# Patient Record
Sex: MALE | Race: WHITE | NOT HISPANIC OR LATINO | Employment: FULL TIME | URBAN - METROPOLITAN AREA
[De-identification: names, ages, dates, MRNs, and addresses within clinical notes are randomized per-mention and may not be internally consistent; named-entity substitution may affect disease eponyms.]

---

## 2019-02-22 ENCOUNTER — OFFICE VISIT (OUTPATIENT)
Dept: ORTHOPEDIC SURGERY | Facility: CLINIC | Age: 49
End: 2019-02-22

## 2019-02-22 VITALS — WEIGHT: 258 LBS | BODY MASS INDEX: 36.12 KG/M2 | HEIGHT: 71 IN | TEMPERATURE: 97.5 F

## 2019-02-22 DIAGNOSIS — M17.11 PRIMARY LOCALIZED OSTEOARTHROSIS OF RIGHT LOWER LEG: Primary | ICD-10-CM

## 2019-02-22 PROCEDURE — 99204 OFFICE O/P NEW MOD 45 MIN: CPT | Performed by: ORTHOPAEDIC SURGERY

## 2019-02-22 PROCEDURE — 20610 DRAIN/INJ JOINT/BURSA W/O US: CPT | Performed by: ORTHOPAEDIC SURGERY

## 2019-02-22 RX ORDER — CHOLECALCIFEROL (VITAMIN D3) 25 MCG
1000 CAPSULE ORAL
COMMUNITY
Start: 2017-06-08

## 2019-02-22 RX ORDER — ALLOPURINOL 100 MG/1
TABLET ORAL
COMMUNITY
Start: 2017-05-15

## 2019-02-22 RX ORDER — MELOXICAM 15 MG/1
TABLET ORAL
Qty: 30 TABLET | Refills: 0 | Status: SHIPPED | OUTPATIENT
Start: 2019-02-22 | End: 2019-05-09 | Stop reason: ALTCHOICE

## 2019-02-22 RX ORDER — ATORVASTATIN CALCIUM 20 MG/1
20 TABLET, FILM COATED ORAL DAILY
COMMUNITY
Start: 2017-05-15

## 2019-02-22 RX ORDER — ALBUTEROL SULFATE 90 UG/1
AEROSOL, METERED RESPIRATORY (INHALATION)
COMMUNITY
Start: 2018-01-18 | End: 2019-09-13

## 2019-02-22 RX ADMIN — LIDOCAINE HYDROCHLORIDE 4 ML: 10 INJECTION, SOLUTION EPIDURAL; INFILTRATION; INTRACAUDAL; PERINEURAL at 11:05

## 2019-02-22 RX ADMIN — METHYLPREDNISOLONE ACETATE 80 MG: 80 INJECTION, SUSPENSION INTRA-ARTICULAR; INTRALESIONAL; INTRAMUSCULAR; SOFT TISSUE at 11:05

## 2019-02-22 NOTE — PROGRESS NOTES
"Knee Follow Up      Patient: Ramana Sullivan        YOB: 1970            Chief Complaints: knee pain      History of Present Illness:      Physical Exam: 48 y.o. male  General Appearance:    Alert, cooperative, in no acute distress                   Vitals:    02/22/19 1036   Temp: 97.5 °F (36.4 °C)   Weight: 117 kg (258 lb)   Height: 180.3 cm (71\")        Patient is alert and read ×3 no acute distress appears her above-listed at height weight and age.  Affect is normal respiratory rate is normal unlabored. Heart rate regular rate rhythm, sclera, dentition and hearing are normal for the purpose of this exam.      Ortho Exam                 Assessment/Plan:            "

## 2019-02-22 NOTE — PROGRESS NOTES
New right Knee      Patient: Ramana Sullivan        YOB: 1970    Medical Record Number: [id number]        Chief Complaints: right knee pain  Chief Complaint   Patient presents with   • Right Knee - Pain, Establish Care           History of Present Illness: This is a 48-year-old male who presents complaining of right knee pain is been ongoing on a couple of years he was sent to Dr. 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 Dispense spacer as needed.   • allopurinol (ZYLOPRIM) 100 MG tablet TAKE 1 TABLET BY MOUTH EVERY DAY   • aspirin 81 MG tablet Take 81 mg by mouth Daily.   • atorvastatin (LIPITOR) 20 MG tablet Take 20 mg by mouth Daily.   • Cholecalciferol (VITAMIN D-3) 1000 units capsule Take 1,000 Units by mouth.     No current facility-administered medications on file prior to visit.      Current Medications:  Scheduled Meds:  Continuous Infusions:  No current facility-administered medications for this visit.   PRN Meds:.    Past Medical History:   Diagnosis Date   • Asthma    • Gout    • Hyperlipemia    • Sleep apnea       History reviewed. No pertinent surgical history.     Social History     Occupational History   • Not on file   Tobacco Use   • Smoking status: Never Smoker   Substance and Sexual Activity   • Alcohol use: Yes   • Drug use: Not on  "file   • Sexual activity: Not on file    Social History     Social History Narrative   • Not on file        Family History   Problem Relation Age of Onset   • Heart disease Father    • Cancer Father         bladder cancer             Review of Systems: 14 point review of systems are remarkable for the pertinent positives listed in the chart by the patient the remainder negative    Review of Systems      Physical Exam: 48 y.o. male  General Appearance:    Alert, cooperative, in no acute distress                 Vitals:    02/22/19 1036   Temp: 97.5 °F (36.4 °C)   Weight: 117 kg (258 lb)   Height: 180.3 cm (71\")      Patient is alert and read ×3 no acute distress appears her above-listed at height weight and age.  Affect is normal respiratory rate is normal unlabored. Heart rate regular rate rhythm, sclera, dentition and hearing are normal for the purpose of this exam.        Ortho Exam Physical exam of the right knee reveals no effusion, no erythema.  It mild loss of extension and full flexion  Patient has mild varus alignment.  They have mild tenderness to palpation about the medial compartment, no tenderness laterally..  The patient has a negative bounce home, negative Cayetano and a stable ligamentous exam.  Quad tone is reasonable and symmetric.  There are no overlying skin changes no lymphedema no lymphadenopathy.  There is good hip range of motion which is full symmetric and asymptomatic and a normal ankle exam.      Large Joint Arthrocentesis: R knee  Date/Time: 2/22/2019 11:05 AM  Consent given by: patient  Site marked: site marked  Timeout: Immediately prior to procedure a time out was called to verify the correct patient, procedure, equipment, support staff and site/side marked as required   Supporting Documentation  Indications: pain   Procedure Details  Location: knee - R knee  Preparation: Patient was prepped and draped in the usual sterile fashion  Needle size: 22 G  Approach: anteromedial  Medications " administered: 80 mg methylPREDNISolone acetate 80 MG/ML; 4 mL lidocaine PF 1% 1 %                     Radiology:   AP, Lateral and merchant views of the right knee  were ordered/reviewed to evauateknee pain.  He did bring these with him he does have some narrowing of his medial compartment but still some joint space remaining is some mild patellofemoral OA as well no acute bony pathology  Imaging Results (most recent)     None        Assessment/Plan:    Right knee pain I think this is degenerative meniscal pathology is probably playing a part in this I think is reasonable to inject this try to get this calm down also start him on Mobic with strict precautions for short period of time if he fails to improve we will pursue other means of testing

## 2019-02-26 RX ORDER — METHYLPREDNISOLONE ACETATE 80 MG/ML
80 INJECTION, SUSPENSION INTRA-ARTICULAR; INTRALESIONAL; INTRAMUSCULAR; SOFT TISSUE
Status: COMPLETED | OUTPATIENT
Start: 2019-02-22 | End: 2019-02-22

## 2019-02-26 RX ORDER — LIDOCAINE HYDROCHLORIDE 10 MG/ML
4 INJECTION, SOLUTION EPIDURAL; INFILTRATION; INTRACAUDAL; PERINEURAL
Status: COMPLETED | OUTPATIENT
Start: 2019-02-22 | End: 2019-02-22

## 2019-04-18 ENCOUNTER — OFFICE VISIT (OUTPATIENT)
Dept: ORTHOPEDIC SURGERY | Facility: CLINIC | Age: 49
End: 2019-04-18

## 2019-04-18 VITALS — HEIGHT: 71 IN | WEIGHT: 240 LBS | BODY MASS INDEX: 33.6 KG/M2 | TEMPERATURE: 97.9 F

## 2019-04-18 DIAGNOSIS — S83.242D TEAR OF MEDIAL MENISCUS OF LEFT KNEE, CURRENT, UNSPECIFIED TEAR TYPE, SUBSEQUENT ENCOUNTER: Primary | ICD-10-CM

## 2019-04-18 PROCEDURE — 99213 OFFICE O/P EST LOW 20 MIN: CPT | Performed by: ORTHOPAEDIC SURGERY

## 2019-04-18 NOTE — PROGRESS NOTES
"Knee Follow Up      Patient: Ramana Sullivan        YOB: 1970            Chief Complaints: knee pain right      History of Present Illness: Patient is here follow-up of right knee pain he got very temporary very minimal relief from an injection his symptoms do still seem mechanical with catching locking intermittent symptoms      Physical Exam: 49 y.o. male  General Appearance:    Alert, cooperative, in no acute distress                   Vitals:    04/18/19 0853   Temp: 97.9 °F (36.6 °C)   TempSrc: Temporal   Weight: 109 kg (240 lb)   Height: 180.3 cm (71\")        Patient is alert and read ×3 no acute distress appears her above-listed at height weight and age.  Affect is normal respiratory rate is normal unlabored. Heart rate regular rate rhythm, sclera, dentition and hearing are normal for the purpose of this exam.      Ortho Exam     Physical exam of the right knee reveals no effusion no redness.  The patient does have tenderness about the medial joint line.  No tenderness about the lateral joint line.  A negative bounce home and a positive medial Cayetano.  There is some pain medially  with a lateral Cayetano.  Patient has a stable ligamentous exam.  Quads are reasonable and symmetric bilaterally.  Calf is soft and nontender.  There is no overlying skin changes no lymphedema lymphadenopathy.  Patient has good hip range of motion full symmetric and asymptomatic and a normal ankle exam.      X-rays do show some mild narrowing of his medial compartment he does have greater than 50% of the joint space remaining he has some patellofemoral OA      Assessment/Plan:      Persistent right knee pain that has been unresponsive to conservative management plan is to proceed with an MRI and have him follow-up after that test      "

## 2019-04-23 ENCOUNTER — TELEPHONE (OUTPATIENT)
Dept: ORTHOPEDIC SURGERY | Facility: CLINIC | Age: 49
End: 2019-04-23

## 2019-05-06 ENCOUNTER — HOSPITAL ENCOUNTER (OUTPATIENT)
Dept: MRI IMAGING | Facility: HOSPITAL | Age: 49
Discharge: HOME | End: 2019-05-06
Admitting: ORTHOPAEDIC SURGERY

## 2019-05-06 DIAGNOSIS — M17.11 PRIMARY LOCALIZED OSTEOARTHROSIS OF RIGHT LOWER LEG: ICD-10-CM

## 2019-05-06 PROCEDURE — 73721 MRI JNT OF LWR EXTRE W/O DYE: CPT

## 2019-05-09 ENCOUNTER — OFFICE VISIT (OUTPATIENT)
Dept: ORTHOPEDIC SURGERY | Facility: CLINIC | Age: 49
End: 2019-05-09

## 2019-05-09 VITALS — TEMPERATURE: 98.1 F | BODY MASS INDEX: 34.33 KG/M2 | HEIGHT: 71 IN | WEIGHT: 245.2 LBS

## 2019-05-09 DIAGNOSIS — M17.11 PRIMARY LOCALIZED OSTEOARTHROSIS OF RIGHT LOWER LEG: Primary | ICD-10-CM

## 2019-05-09 PROCEDURE — 99213 OFFICE O/P EST LOW 20 MIN: CPT | Performed by: ORTHOPAEDIC SURGERY

## 2019-05-09 RX ORDER — MELOXICAM 15 MG/1
TABLET ORAL
Qty: 30 TABLET | Refills: 0 | Status: SHIPPED | OUTPATIENT
Start: 2019-05-09 | End: 2019-06-07 | Stop reason: SDUPTHER

## 2019-05-09 NOTE — PROGRESS NOTES
"Right Knee MRI Follow Up      Patient: Ramana Sullivan        YOB: 1970            Chief Complaints: Right Knee pain      History of Present Illness: The patient is here follow-up of an MRI of the knee partially extruded medial meniscal body.  His symptoms seem to be more with activity less so mechanical probably more due to his arthritis      Physical Exam: 49 y.o. male  General Appearance:    Alert, cooperative, in no acute distress                   Vitals:    05/09/19 1534   Temp: 98.1 °F (36.7 °C)   TempSrc: Temporal   Weight: 111 kg (245 lb 3.2 oz)   Height: 180.3 cm (71\")        Patient is alert and read ×3 no acute distress appears her above-listed at height weight and age.  Affect is normal respiratory rate is normal unlabored. Heart rate regular rate rhythm, sclera, dentition and hearing are normal for the purpose of this exam.      Ortho Exam Physical exam of the right knee reveals no effusion, no erythema.  It mild loss of extension and full flexion  Patient has mild varus alignment.  They have mild tenderness to palpation about the medial compartment, no tenderness laterally..  The patient has a negative bounce home, negative Cayetano and a stable ligamentous exam.  Quad tone is reasonable and symmetric.  There are no overlying skin changes no lymphedema no lymphadenopathy.  There is good hip range of motion which is full symmetric and asymptomatic and a normal ankle exam.      MRI Results: As above I have reviewed the films myself and agree with the findings also reviewed his prior trays which showed narrowing of the medial compartment with some osteophyte formation  Procedures      Assessment/Plan: And that I think is mostly due to his arthritis plan seen with conservative care I will start him on Mobic with strict precautions for short period of time intermittent injections he is asking about PRP which she will research and I discussed with him as well    "

## 2019-06-07 RX ORDER — MELOXICAM 15 MG/1
TABLET ORAL
Qty: 30 TABLET | Refills: 0 | Status: SHIPPED | OUTPATIENT
Start: 2019-06-07

## 2020-07-23 ENCOUNTER — OFFICE VISIT (OUTPATIENT)
Dept: ORTHOPEDIC SURGERY | Facility: CLINIC | Age: 50
End: 2020-07-23

## 2020-07-23 VITALS — HEIGHT: 70 IN | WEIGHT: 245.4 LBS | TEMPERATURE: 97.8 F | BODY MASS INDEX: 35.13 KG/M2

## 2020-07-23 DIAGNOSIS — S83.242A TEAR OF MEDIAL MENISCUS OF LEFT KNEE, CURRENT, UNSPECIFIED TEAR TYPE, INITIAL ENCOUNTER: ICD-10-CM

## 2020-07-23 DIAGNOSIS — M25.562 PAIN IN BOTH KNEES, UNSPECIFIED CHRONICITY: ICD-10-CM

## 2020-07-23 DIAGNOSIS — M25.562 LEFT KNEE PAIN, UNSPECIFIED CHRONICITY: Primary | ICD-10-CM

## 2020-07-23 DIAGNOSIS — M25.561 PAIN IN BOTH KNEES, UNSPECIFIED CHRONICITY: ICD-10-CM

## 2020-07-23 DIAGNOSIS — M17.11 PRIMARY LOCALIZED OSTEOARTHROSIS OF RIGHT LOWER LEG: ICD-10-CM

## 2020-07-23 PROCEDURE — 20610 DRAIN/INJ JOINT/BURSA W/O US: CPT | Performed by: ORTHOPAEDIC SURGERY

## 2020-07-23 PROCEDURE — 99214 OFFICE O/P EST MOD 30 MIN: CPT | Performed by: ORTHOPAEDIC SURGERY

## 2020-07-23 PROCEDURE — 73562 X-RAY EXAM OF KNEE 3: CPT | Performed by: ORTHOPAEDIC SURGERY

## 2020-07-23 RX ORDER — THIAMINE MONONITRATE (VIT B1) 100 MG
100 TABLET ORAL DAILY
COMMUNITY

## 2020-07-23 RX ADMIN — METHYLPREDNISOLONE ACETATE 80 MG: 80 INJECTION, SUSPENSION INTRA-ARTICULAR; INTRALESIONAL; INTRAMUSCULAR; SOFT TISSUE at 11:33

## 2020-07-23 RX ADMIN — METHYLPREDNISOLONE ACETATE 80 MG: 80 INJECTION, SUSPENSION INTRA-ARTICULAR; INTRALESIONAL; INTRAMUSCULAR; SOFT TISSUE at 11:32

## 2020-07-23 NOTE — PROGRESS NOTES
New left Knee      Patient: Ramana Sullivan        YOB: 1970    Medical Record Number: [id number]        Chief Complaints: left knee pain      History of Present Illness: This is a 50-year-old male of seen in the past for right knee problems when I saw him last the right knee was found to have a medial meniscus tear but also some arthritis and extrusion of the medial meniscus.  We treated this nonoperatively states is doing okay it does still hurt states it is hurting right now.  He presents complaining of left knee injury he states he was walking 5 to 6 miles a day with [employer] about 2 weeks ago he twisted his knee when he was getting up on a table with severe pain swelling and night pain.  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   • Cholecalciferol (VITAMIN D-3) 1000 units capsule Take 1,000 Units by mouth.   • thiamine (VITAMIN B-1) 100 MG tablet Take 100 mg by mouth Daily.   • atorvastatin (LIPITOR) 20 MG tablet Take 20 mg by mouth Daily.   • meloxicam (MOBIC) 15 MG tablet TAKE 1 TABLET BY MOUTH DAILY WITH FOOD     No current facility-administered medications on file prior to visit.      Current Medications:  Scheduled Meds:  Continuous Infusions:  No current facility-administered medications for this visit.   PRN Meds:.    Past Medical History:   Diagnosis Date   • Asthma    • Gout    • Hyperlipemia    • Sleep apnea       No past surgical history on file.     Social History     Occupational History   • Not on file   Tobacco Use   • Smoking status: Never Smoker   • Smokeless tobacco: Never Used   Substance and  "Sexual Activity   • Alcohol use: Yes   • Drug use: No   • Sexual activity: Not on file      Social History     Social History Narrative   • Not on file        Family History   Problem Relation Age of Onset   • Heart disease Father    • Cancer Father         bladder cancer             Review of Systems: 14 point review of systems are remarkable for the pertinent positives listed in the chart by the patient remainder negative    Review of Systems      Physical Exam: 50 y.o. male  General Appearance:    Alert, cooperative, in no acute distress                   Vitals:    07/23/20 1055   Temp: 97.8 °F (36.6 °C)   TempSrc: Temporal   Weight: 111 kg (245 lb 6.4 oz)   Height: 177.8 cm (70\")      Patient is alert and read ×3 no acute distress appears her above-listed at height weight and age.  Affect is normal respiratory rate is normal unlabored. Heart rate regular rate rhythm, sclera, dentition and hearing are normal for the purpose of this exam.        Ortho Exam  Physical exam of the left knee reveals no effusion no redness.  The patient does have tenderness about the medial joint line.  No tenderness about the lateral joint line.  A negative bounce home and a positive medial Cayetano.  There is some pain medially  with a lateral Cayetano.  Patient has a stable ligamentous exam.  Quads are reasonable and symmetric bilaterally.  Calf is soft and nontender.  There is no overlying skin changes no lymphedema lymphadenopathy.  Patient has good hip range of motion full symmetric and asymptomatic and a normal ankle exam.    Physical exam of the right knee reveals no effusion, no erythema.  It mild loss of extension and full flexion  Patient has mild varus alignment.  They have mild tenderness to palpation about the medial compartment, no tenderness laterally..  The patient has a negative bounce home, negative Cayetano and a stable ligamentous exam.  Quad tone is reasonable and symmetric.  There are no overlying skin changes " no lymphedema no lymphadenopathy.  There is good hip range of motion which is full symmetric and asymptomatic and a normal ankle exam.    Large Joint Arthrocentesis: R knee  Date/Time: 7/23/2020 11:32 AM  Consent given by: patient  Site marked: site marked  Timeout: Immediately prior to procedure a time out was called to verify the correct patient, procedure, equipment, support staff and site/side marked as required   Supporting Documentation  Indications: pain   Procedure Details  Location: knee - R knee  Preparation: Patient was prepped and draped in the usual sterile fashion  Needle size: 22 G  Approach: anteromedial  Medications administered: 4 mL lidocaine (cardiac); 80 mg methylPREDNISolone acetate 80 MG/ML  Patient tolerance: patient tolerated the procedure well with no immediate complications    Large Joint Arthrocentesis: L knee  Date/Time: 7/23/2020 11:33 AM  Consent given by: patient  Site marked: site marked  Timeout: Immediately prior to procedure a time out was called to verify the correct patient, procedure, equipment, support staff and site/side marked as required   Supporting Documentation  Indications: pain   Procedure Details  Location: knee - L knee  Preparation: Patient was prepped and draped in the usual sterile fashion  Needle size: 22 G  Approach: anteromedial  Medications administered: 4 mL lidocaine (cardiac); 80 mg methylPREDNISolone acetate 80 MG/ML  Patient tolerance: patient tolerated the procedure well with no immediate complications                   Radiology:   AP, Lateral and merchant views of the right and left knee  were ordered/reviewed to evauateknee pain.  I have compared to previous x-rays on the right and also seen AP on the left in July 2018 now he has moderate right knee medial compartment OA with narrowing and osteophyte formation has moderate to severe patellofemoral OA bilaterally left knee looks okay other than patellofemoral issues  Imaging Results (Most Recent)      Procedure Component Value Units Date/Time    XR Knee 3 View Bilateral [[id number]] Resulted:  07/23/20 1049     Updated:  07/23/20 1049    Impression:       Ordering physician's impression is located in the Encounter Note dated 07/23/20. X-ray performed in the DR room.          Assessment/Plan:    Bilateral knee pain on the right I do think is degenerative the left could be meniscal in origin plan is to proceed with an injection as a diagnostic and therapeutic tool if he fails to improve with this on the left I would pursue other means of testing to rule out meniscus.  He would like to go on inject the right knee right ear I think that is fine                              Answers for HPI/ROS submitted by the patient on 7/21/2020   Lower extremity pain  What is the primary reason for your visit?: Lower Extremity Injury  Incident occurred: more than 1 week ago  Incident location: at home  Injury mechanism: other  Pain location: left knee  Pain quality: aching  Pain - numeric: 3/10  Pain course: fluctuating  loss of motion: Yes  Foreign body present: no foreign bodies  Aggravated by: movement, weight bearing

## 2020-07-27 RX ORDER — METHYLPREDNISOLONE ACETATE 80 MG/ML
80 INJECTION, SUSPENSION INTRA-ARTICULAR; INTRALESIONAL; INTRAMUSCULAR; SOFT TISSUE
Status: COMPLETED | OUTPATIENT
Start: 2020-07-23 | End: 2020-07-23

## 2020-11-30 ENCOUNTER — OUTPATIENT (OUTPATIENT)
Dept: OUTPATIENT SERVICES | Facility: HOSPITAL | Age: 50
LOS: 1 days | End: 2020-11-30
Payer: COMMERCIAL

## 2020-11-30 DIAGNOSIS — Z11.59 ENCOUNTER FOR SCREENING FOR OTHER VIRAL DISEASES: ICD-10-CM

## 2020-11-30 LAB — SARS-COV-2 RNA SPEC QL NAA+PROBE: SIGNIFICANT CHANGE UP

## 2020-11-30 PROCEDURE — U0003: CPT

## 2020-12-01 DIAGNOSIS — Z11.59 ENCOUNTER FOR SCREENING FOR OTHER VIRAL DISEASES: ICD-10-CM

## 2021-03-26 ENCOUNTER — BULK ORDERING (OUTPATIENT)
Dept: CASE MANAGEMENT | Facility: OTHER | Age: 51
End: 2021-03-26

## 2021-03-26 DIAGNOSIS — Z23 IMMUNIZATION DUE: ICD-10-CM

## 2021-05-20 ENCOUNTER — OFFICE VISIT (OUTPATIENT)
Dept: ORTHOPEDIC SURGERY | Facility: CLINIC | Age: 51
End: 2021-05-20

## 2021-05-20 VITALS — WEIGHT: 245 LBS | HEIGHT: 71 IN | TEMPERATURE: 97.3 F | BODY MASS INDEX: 34.3 KG/M2

## 2021-05-20 DIAGNOSIS — M17.11 PRIMARY LOCALIZED OSTEOARTHROSIS OF RIGHT LOWER LEG: Primary | ICD-10-CM

## 2021-05-20 DIAGNOSIS — M25.561 RIGHT KNEE PAIN, UNSPECIFIED CHRONICITY: ICD-10-CM

## 2021-05-20 PROCEDURE — 99213 OFFICE O/P EST LOW 20 MIN: CPT | Performed by: ORTHOPAEDIC SURGERY

## 2021-05-20 PROCEDURE — 73562 X-RAY EXAM OF KNEE 3: CPT | Performed by: ORTHOPAEDIC SURGERY

## 2021-05-20 PROCEDURE — 20610 DRAIN/INJ JOINT/BURSA W/O US: CPT | Performed by: ORTHOPAEDIC SURGERY

## 2021-05-20 RX ADMIN — LIDOCAINE HYDROCHLORIDE 4 ML: 20 INJECTION, SOLUTION EPIDURAL; INFILTRATION; INTRACAUDAL; PERINEURAL at 15:43

## 2021-05-20 RX ADMIN — METHYLPREDNISOLONE ACETATE 80 MG: 80 INJECTION, SUSPENSION INTRA-ARTICULAR; INTRALESIONAL; INTRAMUSCULAR; SOFT TISSUE at 15:43

## 2021-05-20 NOTE — PROGRESS NOTES
Patient: Ramana Sullivan  YOB: 1970  Date of Service: 5/20/2021    Chief Complaints: Bilateral knee pain right greater than left    Subjective:    History of Present Illness: Pt is seen in the office today with complaints of bilateral knee pain I really saw him initially for left knee 1 year ago.  Both knees were bothering him at that time he was found to have some degenerative changes which were moderate on the right with osteophyte formation and also severe patellofemoral they were less so on the left.  He states he is done well for several months over the last couple of months has had worsening symptoms point of night pain no real new history of injury that he can recall no change in activity his symptoms are moderate intermittent aching worse with activity somewhat better with rest his past medical history is right for hyper lipidemia asthma gout        Allergies: No Known Allergies    Medications:   Home Medications:  Current Outpatient Medications on File Prior to Visit   Medication Sig   • allopurinol (ZYLOPRIM) 100 MG tablet TAKE 1 TABLET BY MOUTH EVERY DAY   • aspirin 81 MG tablet Take 81 mg by mouth Daily.   • atorvastatin (LIPITOR) 20 MG tablet Take 20 mg by mouth Daily.   • Cholecalciferol (VITAMIN D-3) 1000 units capsule Take 1,000 Units by mouth.   • meloxicam (MOBIC) 15 MG tablet TAKE 1 TABLET BY MOUTH DAILY WITH FOOD   • thiamine (VITAMIN B-1) 100 MG tablet Take 100 mg by mouth Daily.     No current facility-administered medications on file prior to visit.     Current Medications:  Scheduled Meds:  Continuous Infusions:No current facility-administered medications for this visit.    PRN Meds:.    I have reviewed the patient's medical history in detail and updated the computerized patient record.  Review and summarization of old records include:    Past Medical History:   Diagnosis Date   • Asthma    • Gout    • Hyperlipemia    • Sleep apnea       No past surgical history on file.      Social History     Occupational History   • Not on file   Tobacco Use   • Smoking status: Never Smoker   • Smokeless tobacco: Never Used   Substance and Sexual Activity   • Alcohol use: Yes   • Drug use: No   • Sexual activity: Not on file      Social History     Social History Narrative   • Not on file        Family History   Problem Relation Age of Onset   • Heart disease Father    • Cancer Father         bladder cancer       ROS: 14 point review of systems was performed and was negative except for documented findings in HPI and today's encounter.     Allergies: No Known Allergies  Constitutional:  Denies fever, shaking or chills   Eyes:  Denies change in visual acuity   HENT:  Denies nasal congestion or sore throat   Respiratory:  Denies cough or shortness of breath   Cardiovascular:  Denies chest pain or severe LE edema   GI:  Denies abdominal pain, nausea, vomiting, bloody stools or diarrhea   Musculoskeletal:  Numbness, tingling, or loss of motor function only as noted above in history of present illness.  : Denies painful urination or hematuria  Integument:  Denies rash, lesion or ulceration   Neurologic:  Denies headache or focal weakness  Endocrine:  Denies lymphadenopathy  Psych:  Denies confusion or change in mental status   Hem:  Denies active bleeding      Physical Exam: 51 y.o. male  Wt Readings from Last 3 Encounters:   07/23/20 111 kg (245 lb 6.4 oz)   05/09/19 111 kg (245 lb 3.2 oz)   05/06/19 109 kg (240 lb)       There is no height or weight on file to calculate BMI.  No height and weight on file for this encounter.  There were no vitals filed for this visit.  Vital signs reviewed.   General Appearance:    Alert, cooperative, in no acute distress                    Ortho exam    14 point review of systems are remarkable for the bilateral knee pain only the remainder negative per the patient    Physical exam of the right knee reveals no effusion, no erythema.  It mild loss of extension and full  flexion  Patient has mild varus alignment.  They have mild tenderness to palpation about the medial compartment, no tenderness laterally..  The patient has a negative bounce home, negative Cayetano and a stable ligamentous exam.  Quad tone is reasonable and symmetric.  There are no overlying skin changes no lymphedema no lymphadenopathy.  There is good hip range of motion which is full symmetric and asymptomatic and a normal ankle exam.  Physical exam of the left knee reveals no effusion, no erythema.  It mild loss of extension and full flexion  Patient has mild varus alignment.  They have mild tenderness to palpation about the medial compartment, no tenderness laterally..  The patient has a negative bounce home, negative Cayetano and a stable ligamentous exam.  Quad tone is reasonable and symmetric.  There are no overlying skin changes no lymphedema no lymphadenopathy.  There is good hip range of motion which is full symmetric and asymptomatic and a normal ankle exam.         X-rays AP lateral merchant view the right knee were taken to evaluate his symptoms and compared x-rays done last year obviously we do see AP of the left knee on here as well he has severe medial compartment OA right is worse than left    Assessment:     Plan:   Follow up as indicated.  Ice, elevate, and rest as needed.  Discussed conservative measures of pain control including ice, bracing.  Also talked about the importance of strengthening and maintaining ideal body weight    Radha Ramirez M.D.    Large Joint Arthrocentesis: L knee  Date/Time: 5/20/2021 3:43 PM  Consent given by: patient  Site marked: site marked  Timeout: Immediately prior to procedure a time out was called to verify the correct patient, procedure, equipment, support staff and site/side marked as required   Supporting Documentation  Indications: pain   Procedure Details  Location: knee - L knee  Preparation: Patient was prepped and draped in the usual sterile fashion  Needle  gauge: 21G.  Approach: anteromedial  Medications administered: 80 mg methylPREDNISolone acetate 80 MG/ML; 4 mL lidocaine PF 2% 2 %  Patient tolerance: patient tolerated the procedure well with no immediate complications    Large Joint Arthrocentesis: R knee  Date/Time: 5/20/2021 3:43 PM  Consent given by: patient  Site marked: site marked  Timeout: Immediately prior to procedure a time out was called to verify the correct patient, procedure, equipment, support staff and site/side marked as required   Supporting Documentation  Indications: pain   Procedure Details  Location: knee - R knee  Preparation: Patient was prepped and draped in the usual sterile fashion  Needle gauge: 21G.  Approach: anteromedial  Medications administered: 80 mg methylPREDNISolone acetate 80 MG/ML; 4 mL lidocaine PF 2% 2 %  Patient tolerance: patient tolerated the procedure well with no immediate complications

## 2021-05-21 RX ORDER — METHYLPREDNISOLONE ACETATE 80 MG/ML
80 INJECTION, SUSPENSION INTRA-ARTICULAR; INTRALESIONAL; INTRAMUSCULAR; SOFT TISSUE
Status: COMPLETED | OUTPATIENT
Start: 2021-05-20 | End: 2021-05-20

## 2021-05-21 RX ORDER — LIDOCAINE HYDROCHLORIDE 20 MG/ML
4 INJECTION, SOLUTION EPIDURAL; INFILTRATION; INTRACAUDAL; PERINEURAL
Status: COMPLETED | OUTPATIENT
Start: 2021-05-20 | End: 2021-05-20
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
## 2021-07-16 ENCOUNTER — HOSPITAL ENCOUNTER (OUTPATIENT)
Dept: CARDIOLOGY | Facility: HOSPITAL | Age: 51
Discharge: HOME | End: 2021-07-16
Admitting: ORTHOPAEDIC SURGERY

## 2021-07-16 ENCOUNTER — OFFICE VISIT (OUTPATIENT)
Dept: ORTHOPEDIC SURGERY | Facility: CLINIC | Age: 51
End: 2021-07-16

## 2021-07-16 VITALS — HEIGHT: 70 IN | BODY MASS INDEX: 30.06 KG/M2 | WEIGHT: 210 LBS | TEMPERATURE: 96.2 F

## 2021-07-16 DIAGNOSIS — M79.89 PAIN AND SWELLING OF LEFT LOWER LEG: ICD-10-CM

## 2021-07-16 DIAGNOSIS — M79.662 PAIN AND SWELLING OF LEFT LOWER LEG: ICD-10-CM

## 2021-07-16 DIAGNOSIS — M25.562 LEFT KNEE PAIN, UNSPECIFIED CHRONICITY: Primary | ICD-10-CM

## 2021-07-16 LAB
BH CV LOWER VASCULAR LEFT COMMON FEMORAL AUGMENT: NORMAL
BH CV LOWER VASCULAR LEFT COMMON FEMORAL COMPETENT: NORMAL
BH CV LOWER VASCULAR LEFT COMMON FEMORAL COMPRESS: NORMAL
BH CV LOWER VASCULAR LEFT COMMON FEMORAL PHASIC: NORMAL
BH CV LOWER VASCULAR LEFT COMMON FEMORAL SPONT: NORMAL
BH CV LOWER VASCULAR LEFT DISTAL FEMORAL COMPRESS: NORMAL
BH CV LOWER VASCULAR LEFT GASTRONEMIUS COMPRESS: NORMAL
BH CV LOWER VASCULAR LEFT GREATER SAPH AK COMPRESS: NORMAL
BH CV LOWER VASCULAR LEFT GREATER SAPH BK COMPRESS: NORMAL
BH CV LOWER VASCULAR LEFT LESSER SAPH COMPRESS: NORMAL
BH CV LOWER VASCULAR LEFT MID FEMORAL AUGMENT: NORMAL
BH CV LOWER VASCULAR LEFT MID FEMORAL COMPETENT: NORMAL
BH CV LOWER VASCULAR LEFT MID FEMORAL COMPRESS: NORMAL
BH CV LOWER VASCULAR LEFT MID FEMORAL PHASIC: NORMAL
BH CV LOWER VASCULAR LEFT MID FEMORAL SPONT: NORMAL
BH CV LOWER VASCULAR LEFT PERONEAL COMPRESS: NORMAL
BH CV LOWER VASCULAR LEFT POPLITEAL AUGMENT: NORMAL
BH CV LOWER VASCULAR LEFT POPLITEAL COMPETENT: NORMAL
BH CV LOWER VASCULAR LEFT POPLITEAL COMPRESS: NORMAL
BH CV LOWER VASCULAR LEFT POPLITEAL PHASIC: NORMAL
BH CV LOWER VASCULAR LEFT POPLITEAL SPONT: NORMAL
BH CV LOWER VASCULAR LEFT POSTERIOR TIBIAL COMPRESS: NORMAL
BH CV LOWER VASCULAR LEFT PROFUNDA FEMORAL COMPRESS: NORMAL
BH CV LOWER VASCULAR LEFT PROXIMAL FEMORAL COMPRESS: NORMAL
BH CV LOWER VASCULAR LEFT SAPHENOFEMORAL JUNCTION COMPRESS: NORMAL
BH CV LOWER VASCULAR RIGHT COMMON FEMORAL AUGMENT: NORMAL
BH CV LOWER VASCULAR RIGHT COMMON FEMORAL COMPETENT: NORMAL
BH CV LOWER VASCULAR RIGHT COMMON FEMORAL COMPRESS: NORMAL
BH CV LOWER VASCULAR RIGHT COMMON FEMORAL PHASIC: NORMAL
BH CV LOWER VASCULAR RIGHT COMMON FEMORAL SPONT: NORMAL
MAXIMAL PREDICTED HEART RATE: 167 BPM
STRESS TARGET HR: 142 BPM

## 2021-07-16 PROCEDURE — 93971 EXTREMITY STUDY: CPT

## 2021-07-16 PROCEDURE — 99213 OFFICE O/P EST LOW 20 MIN: CPT | Performed by: ORTHOPAEDIC SURGERY

## 2021-07-16 RX ORDER — IBUPROFEN 600 MG/1
TABLET ORAL
COMMUNITY
Start: 2021-06-29

## 2021-07-29 ENCOUNTER — OFFICE VISIT (OUTPATIENT)
Dept: ORTHOPEDIC SURGERY | Facility: CLINIC | Age: 51
End: 2021-07-29

## 2021-07-29 VITALS — HEIGHT: 71 IN | TEMPERATURE: 97.3 F | WEIGHT: 210 LBS | BODY MASS INDEX: 29.4 KG/M2

## 2021-07-29 DIAGNOSIS — M25.562 ACUTE PAIN OF LEFT KNEE: ICD-10-CM

## 2021-07-29 DIAGNOSIS — M25.532 LEFT WRIST PAIN: Primary | ICD-10-CM

## 2021-07-29 PROCEDURE — 73110 X-RAY EXAM OF WRIST: CPT | Performed by: ORTHOPAEDIC SURGERY

## 2021-07-29 PROCEDURE — 99213 OFFICE O/P EST LOW 20 MIN: CPT | Performed by: ORTHOPAEDIC SURGERY
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
## 2021-08-23 ENCOUNTER — TELEPHONE (OUTPATIENT)
Dept: ORTHOPEDIC SURGERY | Facility: CLINIC | Age: 51
End: 2021-08-23

## 2021-08-23 DIAGNOSIS — M25.532 LEFT WRIST PAIN: Primary | ICD-10-CM

## 2021-08-23 NOTE — TELEPHONE ENCOUNTER
Caller: KAREN CLARK     Relationship: SELF     Best call back number: [phone]    What was the call regarding: PATIENT STILL HAVING SOME PAIN IN L WRIST; RANGE OF MOTION IS STILL NOT THE SAME AS OTHER WRIST- PATIENT WOULD LIKE TO DISCUSS IF HE NEEDS MRI- PLEASE CALL PATIENT BACK     Do you require a callback: YES
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
## 2021-08-25 ENCOUNTER — TELEPHONE (OUTPATIENT)
Dept: ORTHOPEDIC SURGERY | Facility: CLINIC | Age: 51
End: 2021-08-25

## 2021-08-25 NOTE — TELEPHONE ENCOUNTER
Caller: KAREN CLARK    Relationship to patient: SELF     Best call back number: [phone]    Patient is needing: PATIENT RECEIVED A PHONE CALL FROM THE OFFICE WITHOUT VOICEMAIL, AND WANTED TO KNOW WHAT IT WAS REGARDING.  PATIENT HAS AN UPCOMING MRI ON 09/03.

## 2021-09-03 ENCOUNTER — HOSPITAL ENCOUNTER (OUTPATIENT)
Dept: MRI IMAGING | Facility: HOSPITAL | Age: 51
Discharge: HOME | End: 2021-09-03
Admitting: NURSE PRACTITIONER

## 2021-09-03 DIAGNOSIS — M25.532 LEFT WRIST PAIN: ICD-10-CM

## 2021-09-03 PROCEDURE — 73221 MRI JOINT UPR EXTREM W/O DYE: CPT

## 2021-10-04 NOTE — PROGRESS NOTES
New Knee      Patient: Ramana Sullivan        YOB: 1970    Medical Record Number: [id number]        Chief Complaints:       History of Present Illness: This is a         Allergies: No Known Allergies    Medications:   Home Medications:  Current Outpatient Medications on File Prior to Visit   Medication Sig   • allopurinol (ZYLOPRIM) 100 MG tablet TAKE 1 TABLET BY MOUTH EVERY DAY   • aspirin 81 MG tablet Take 81 mg by mouth Daily.   • atorvastatin (LIPITOR) 20 MG tablet Take 20 mg by mouth Daily.   • Cholecalciferol (VITAMIN D-3) 1000 units capsule Take 1,000 Units by mouth.   • ibuprofen (ADVIL,MOTRIN) 600 MG tablet TAKE 1 TABLET BY MOUTH EVERY 6 HOURS FOR 10 DAYS   • meloxicam (MOBIC) 15 MG tablet TAKE 1 TABLET BY MOUTH DAILY WITH FOOD   • thiamine (VITAMIN B-1) 100 MG tablet Take 100 mg by mouth Daily.     No current facility-administered medications on file prior to visit.     Current Medications:  Scheduled Meds:  Continuous Infusions:No current facility-administered medications for this visit.    PRN Meds:.    Past Medical History:   Diagnosis Date   • Asthma    • Gout    • Hyperlipemia    • Sleep apnea       No past surgical history on file.     Social History     Occupational History   • Not on file   Tobacco Use   • Smoking status: Never Smoker   • Smokeless tobacco: Never Used   Substance and Sexual Activity   • Alcohol use: Yes   • Drug use: No   • Sexual activity: Not on file      Social History     Social History Narrative   • Not on file        Family History   Problem Relation Age of Onset   • Heart disease Father    • Cancer Father         bladder cancer             Review of Systems: ***    Review of Systems      Physical Exam: 51 y.o. male  General Appearance:    Alert, cooperative, in no acute distress                 There were no vitals filed for this visit.   Patient is alert and read ×3 no acute distress appears her above-listed at height weight and age.  Affect is normal  respiratory rate is normal unlabored. Heart rate regular rate rhythm, sclera, dentition and hearing are normal for the purpose of this exam.        Ortho Exam    Procedures             Radiology:   AP, Lateral and merchant views of the *** knee  were ordered/reviewed to evauateknee pain.   Imaging Results (Most Recent)     None        Assessment/Plan:

## 2021-10-05 ENCOUNTER — OFFICE VISIT (OUTPATIENT)
Dept: ORTHOPEDIC SURGERY | Facility: CLINIC | Age: 51
End: 2021-10-05

## 2021-10-05 VITALS — TEMPERATURE: 97.1 F | BODY MASS INDEX: 35.7 KG/M2 | WEIGHT: 255 LBS | HEIGHT: 71 IN

## 2021-10-05 DIAGNOSIS — M25.532 LEFT WRIST PAIN: ICD-10-CM

## 2021-10-05 DIAGNOSIS — M17.0 PRIMARY OSTEOARTHRITIS OF BOTH KNEES: Primary | ICD-10-CM

## 2021-10-05 PROCEDURE — 99212 OFFICE O/P EST SF 10 MIN: CPT | Performed by: ORTHOPAEDIC SURGERY

## 2021-10-05 PROCEDURE — 20610 DRAIN/INJ JOINT/BURSA W/O US: CPT | Performed by: ORTHOPAEDIC SURGERY

## 2021-10-05 RX ADMIN — METHYLPREDNISOLONE ACETATE 80 MG: 80 INJECTION, SUSPENSION INTRA-ARTICULAR; INTRALESIONAL; INTRAMUSCULAR; SOFT TISSUE at 16:17

## 2021-10-05 RX ADMIN — METHYLPREDNISOLONE ACETATE 80 MG: 80 INJECTION, SUSPENSION INTRA-ARTICULAR; INTRALESIONAL; INTRAMUSCULAR; SOFT TISSUE at 16:18
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
## 2021-10-06 RX ORDER — METHYLPREDNISOLONE ACETATE 80 MG/ML
80 INJECTION, SUSPENSION INTRA-ARTICULAR; INTRALESIONAL; INTRAMUSCULAR; SOFT TISSUE
Status: COMPLETED | OUTPATIENT
Start: 2021-10-05 | End: 2021-10-05

## 2022-02-16 ENCOUNTER — TELEPHONE (OUTPATIENT)
Dept: ORTHOPEDIC SURGERY | Facility: CLINIC | Age: 52
End: 2022-02-16

## 2022-02-16 NOTE — TELEPHONE ENCOUNTER
Caller: PATIENT    Relationship to patient: SELF     Best call back number:  [phone]    Chief complaint: BILATERAL KNEE PAIN    Type of visit: INJECTION OR FOLLOW UP    Requested date: ASAP    If rescheduling, when is the original appointment: N/A    Additional notes: PATIENT WAS CALLING TO SCHEDULE AN INJECTION WITH DR. DUNBAR FOR BOTH KNEES. WHILE ON THE PHONE PATIENT MENTIONED HE WANTED TO ALSO TALK TO DR. DUNBAR ABOUT THE NEXT STEP IN TREATMENT. PATIENT DIDN'T WANT TO COME IN FOR AN INJECTION AND NOT BE ABLE TO TALK TO DR. DUNBAR AS WELL. I TOLD PATIENT I WOULD SEE WHAT THE OFFICE RECOMMENDS AND SEE IF HE NEEDS TO SCHEDULE A FOLLOW UP APPOINTMENT OR AN INJECTION SINCE HE WOULD LIKE TO DO BOTH. THANK YOU!

## 2022-03-03 ENCOUNTER — OFFICE VISIT (OUTPATIENT)
Dept: ORTHOPEDIC SURGERY | Facility: CLINIC | Age: 52
End: 2022-03-03

## 2022-03-03 VITALS — TEMPERATURE: 97.6 F | BODY MASS INDEX: 35.7 KG/M2 | HEIGHT: 71 IN | WEIGHT: 255 LBS

## 2022-03-03 DIAGNOSIS — M17.10 PRIMARY LOCALIZED OSTEOARTHROSIS OF LOWER LEG, UNSPECIFIED LATERALITY: Primary | ICD-10-CM

## 2022-03-03 PROCEDURE — 99213 OFFICE O/P EST LOW 20 MIN: CPT | Performed by: ORTHOPAEDIC SURGERY

## 2022-03-03 NOTE — PROGRESS NOTES
Patient: Ramana Sullivan  YOB: 1970  Date of Service: 3/3/2022    Chief Complaints: Bilateral knee pain    Subjective:    History of Present Illness: Pt is seen in the office today with complaints of bilateral knee pain he has known medial compartment OA we got injections he states he got good relief in the past but this last month did not last that long his pain is primarily medial is very activity limiting.          Allergies: No Known Allergies    Medications:   Home Medications:  Current Outpatient Medications on File Prior to Visit   Medication Sig   • allopurinol (ZYLOPRIM) 100 MG tablet TAKE 1 TABLET BY MOUTH EVERY DAY   • aspirin 81 MG tablet Take 81 mg by mouth Daily.   • atorvastatin (LIPITOR) 20 MG tablet Take 20 mg by mouth Daily.   • Cholecalciferol (VITAMIN D-3) 1000 units capsule Take 1,000 Units by mouth.   • ibuprofen (ADVIL,MOTRIN) 600 MG tablet TAKE 1 TABLET BY MOUTH EVERY 6 HOURS FOR 10 DAYS   • meloxicam (MOBIC) 15 MG tablet TAKE 1 TABLET BY MOUTH DAILY WITH FOOD   • thiamine (VITAMIN B-1) 100 MG tablet Take 100 mg by mouth Daily.     No current facility-administered medications on file prior to visit.     Current Medications:  Scheduled Meds:  Continuous Infusions:No current facility-administered medications for this visit.    PRN Meds:.    I have reviewed the patient's medical history in detail and updated the computerized patient record.  Review and summarization of old records include:    Past Medical History:   Diagnosis Date   • Asthma    • Fracture of wrist    • Gout    • Hyperlipemia    • Knee swelling    • Sleep apnea       No past surgical history on file.     Social History     Occupational History   • Not on file   Tobacco Use   • Smoking status: Never Smoker   • Smokeless tobacco: Never Used   Vaping Use   • Vaping Use: Never used   Substance and Sexual Activity   • Alcohol use: Yes     Alcohol/week: 3.0 standard drinks     Types: 2 Cans of beer, 1 Shots of liquor  per week   • Drug use: No   • Sexual activity: Yes     Partners: Female      Social History     Social History Narrative   • Not on file        Family History   Problem Relation Age of Onset   • Heart disease Father    • Cancer Father         bladder cancer       ROS: 14 point review of systems was performed and was negative except for documented findings in HPI and today's encounter.     Allergies: No Known Allergies  Constitutional:  Denies fever, shaking or chills   Eyes:  Denies change in visual acuity   HENT:  Denies nasal congestion or sore throat   Respiratory:  Denies cough or shortness of breath   Cardiovascular:  Denies chest pain or severe LE edema   GI:  Denies abdominal pain, nausea, vomiting, bloody stools or diarrhea   Musculoskeletal:  Numbness, tingling, or loss of motor function only as noted above in history of present illness.  : Denies painful urination or hematuria  Integument:  Denies rash, lesion or ulceration   Neurologic:  Denies headache or focal weakness  Endocrine:  Denies lymphadenopathy  Psych:  Denies confusion or change in mental status   Hem:  Denies active bleeding      Physical Exam: 52 y.o. male  Wt Readings from Last 3 Encounters:   10/05/21 116 kg (255 lb)   09/03/21 95.3 kg (210 lb)   07/29/21 95.3 kg (210 lb)       There is no height or weight on file to calculate BMI.  No height and weight on file for this encounter.  There were no vitals filed for this visit.  Vital signs reviewed.   General Appearance:    Alert, cooperative, in no acute distress                    Ortho exam      Physical exam of the left and right knee reveals no effusion, no erythema.  It mild loss of extension and full flexion  Patient has mild varus alignment.  They have mild tenderness to palpation about the medial compartment, no tenderness laterally..  The patient has a negative bounce home, negative Cayetano and a stable ligamentous exam.  Quad tone is reasonable and symmetric.  There are no  overlying skin changes no lymphedema no lymphadenopathy.  There is good hip range of motion which is full symmetric and asymptomatic and a normal ankle exam.         .time    Assessment: Bilateral knee DJD had a long discussion with him regarding other options we talked about referral to Dr. Ibanez he wants to exhaust everything else conservative first talked about gel and talked about PRP I think we both agreed the PRP is the next step he would like to pursue he understands what that is understands insurance does not cover it.  We also talked again about importance of quad and core strengthening ultimately I will get him to Dr. Ibanez    Plan:   Follow up as indicated.  Ice, elevate, and rest as needed.  Discussed conservative measures of pain control including ice, bracing.  Also talked about the importance of strengthening and maintaining ideal body weight    Radha Ramirez M.D.

## 2022-03-15 ENCOUNTER — TELEPHONE (OUTPATIENT)
Dept: ORTHOPEDIC SURGERY | Facility: CLINIC | Age: 52
End: 2022-03-15

## 2022-03-15 NOTE — TELEPHONE ENCOUNTER
Caller: Ramana Sullivan    Relationship: Self    Best call back number: [phone]    What is the best time to reach you: ANYTIME    Who are you requesting to speak with (clinical staff, provider,  specific staff member): CLINICAL STAFF      Do you require a callback: YES - PT WOULD LIKE TO R/S HIS INJECTION ON 04/11/2022.     HE IS REQUESTING IF AVAIL MARCH 29 OR April 4TH

## 2022-04-04 ENCOUNTER — CLINICAL SUPPORT (OUTPATIENT)
Dept: ORTHOPEDIC SURGERY | Facility: CLINIC | Age: 52
End: 2022-04-04

## 2022-04-04 VITALS — BODY MASS INDEX: 36.26 KG/M2 | WEIGHT: 259 LBS | HEIGHT: 71 IN | TEMPERATURE: 97.8 F

## 2022-04-04 DIAGNOSIS — M17.11 PRIMARY LOCALIZED OSTEOARTHROSIS OF RIGHT LOWER LEG: Primary | ICD-10-CM

## 2022-04-04 PROCEDURE — PRPACP: Performed by: ORTHOPAEDIC SURGERY

## 2022-04-04 NOTE — PROGRESS NOTES
Patient: Ramana Sullivan  YOB: 1970  Date of Service: 4/4/2022    Chief Complaints: Right knee pain    Subjective:    History of Present Illness: Pt is seen in the office today with complaints of right knee pain he has known degenerative changes he does get intermittent injections does get some relief of those he is here for PRP injection today understands insurance does not cover that and it is 400 bucks.          Allergies: No Known Allergies    Medications:   Home Medications:  Current Outpatient Medications on File Prior to Visit   Medication Sig   • allopurinol (ZYLOPRIM) 100 MG tablet TAKE 1 TABLET BY MOUTH EVERY DAY   • aspirin 81 MG tablet Take 81 mg by mouth Daily.   • atorvastatin (LIPITOR) 20 MG tablet Take 20 mg by mouth Daily.   • Cholecalciferol (VITAMIN D-3) 1000 units capsule Take 1,000 Units by mouth.   • ibuprofen (ADVIL,MOTRIN) 600 MG tablet TAKE 1 TABLET BY MOUTH EVERY 6 HOURS FOR 10 DAYS   • meloxicam (MOBIC) 15 MG tablet TAKE 1 TABLET BY MOUTH DAILY WITH FOOD   • thiamine (VITAMIN B-1) 100 MG tablet Take 100 mg by mouth Daily.     No current facility-administered medications on file prior to visit.     Current Medications:  Scheduled Meds:  Continuous Infusions:No current facility-administered medications for this visit.    PRN Meds:.    I have reviewed the patient's medical history in detail and updated the computerized patient record.  Review and summarization of old records include:    Past Medical History:   Diagnosis Date   • Asthma    • Fracture of wrist    • Gout    • Hyperlipemia    • Knee swelling    • Sleep apnea       No past surgical history on file.     Social History     Occupational History   • Not on file   Tobacco Use   • Smoking status: Never Smoker   • Smokeless tobacco: Never Used   Vaping Use   • Vaping Use: Never used   Substance and Sexual Activity   • Alcohol use: Yes     Alcohol/week: 3.0 standard drinks     Types: 2 Cans of beer, 1 Shots of liquor per  week   • Drug use: No   • Sexual activity: Yes     Partners: Female      Social History     Social History Narrative   • Not on file        Family History   Problem Relation Age of Onset   • Heart disease Father    • Cancer Father         bladder cancer       ROS: 14 point review of systems was performed and was negative except for documented findings in HPI and today's encounter.     Allergies: No Known Allergies  Constitutional:  Denies fever, shaking or chills   Eyes:  Denies change in visual acuity   HENT:  Denies nasal congestion or sore throat   Respiratory:  Denies cough or shortness of breath   Cardiovascular:  Denies chest pain or severe LE edema   GI:  Denies abdominal pain, nausea, vomiting, bloody stools or diarrhea   Musculoskeletal:  Numbness, tingling, or loss of motor function only as noted above in history of present illness.  : Denies painful urination or hematuria  Integument:  Denies rash, lesion or ulceration   Neurologic:  Denies headache or focal weakness  Endocrine:  Denies lymphadenopathy  Psych:  Denies confusion or change in mental status   Hem:  Denies active bleeding      Physical Exam: 52 y.o. male  Wt Readings from Last 3 Encounters:   03/03/22 116 kg (255 lb)   10/05/21 116 kg (255 lb)   09/03/21 95.3 kg (210 lb)       There is no height or weight on file to calculate BMI.  No height and weight on file for this encounter.  There were no vitals filed for this visit.  Vital signs reviewed.   General Appearance:    Alert, cooperative, in no acute distress                    Ortho exam    Physical exam of the right knee reveals no effusion, no erythema.  It mild loss of extension and full flexion  Patient has mild varus alignment.  They have mild tenderness to palpation about the medial compartment, no tenderness laterally..  The patient has a negative bounce home, negative Cayetano and a stable ligamentous exam.  Quad tone is reasonable and symmetric.  There are no overlying skin  changes no lymphedema no lymphadenopathy.  There is good hip range of motion which is full symmetric and asymptomatic and a normal ankle exam.           .time    Assessment: Right knee OA    Plan: PRP injection Vesta harvested the blood we injected sterilely without difficulty  Follow up as indicated.  Ice, elevate, and rest as needed.  Discussed conservative measures of pain control including ice, bracing.  Also talked about the importance of strengthening and maintaining ideal body weight    Radha Ramirez M.D.      Large Joint Arthrocentesis: R knee  Date/Time: 4/4/2022 8:34 AM  Consent given by: patient  Site marked: site marked  Timeout: Immediately prior to procedure a time out was called to verify the correct patient, procedure, equipment, support staff and site/side marked as required   Supporting Documentation  Indications: pain   Procedure Details  Location: knee - R knee  Preparation: Patient was prepped and draped in the usual sterile fashion  Needle gauge: 21G.  Approach: anteromedial  Medications administered: 4 mL lidocaine (cardiac) (PRP)  Patient tolerance: patient tolerated the procedure well with no immediate complications

## 2022-12-02 ENCOUNTER — OFFICE VISIT (OUTPATIENT)
Dept: ORTHOPEDIC SURGERY | Facility: CLINIC | Age: 52
End: 2022-12-02

## 2022-12-02 VITALS — HEIGHT: 70 IN | WEIGHT: 250.1 LBS | BODY MASS INDEX: 35.8 KG/M2 | TEMPERATURE: 97.1 F

## 2022-12-02 DIAGNOSIS — M17.11 PRIMARY LOCALIZED OSTEOARTHROSIS OF RIGHT LOWER LEG: Primary | ICD-10-CM

## 2022-12-02 PROCEDURE — 73562 X-RAY EXAM OF KNEE 3: CPT | Performed by: ORTHOPAEDIC SURGERY

## 2022-12-02 PROCEDURE — 99212 OFFICE O/P EST SF 10 MIN: CPT | Performed by: ORTHOPAEDIC SURGERY

## 2022-12-02 RX ORDER — ALBUTEROL SULFATE 90 UG/1
AEROSOL, METERED RESPIRATORY (INHALATION)
COMMUNITY
Start: 2022-11-10

## 2022-12-02 RX ORDER — LIRAGLUTIDE 6 MG/ML
INJECTION, SOLUTION SUBCUTANEOUS
COMMUNITY
Start: 2022-10-03

## 2022-12-02 NOTE — PROGRESS NOTES
Patient: Ramana Sullivan  YOB: 1970  Date of Service: 12/2/2022    Chief Complaints: Right knee pain  Subjective:    History of Present Illness: Pt is seen in the office today with complaints of right knee pain he has known degenerative changes the last I saw him we did a PRP injection this was April he states he hurt a lot worse for 2 to 3 weeks then all of a sudden he can get in his knee is doing great he just really Briones to make appointment to decide and know what his options were going forward.          Allergies: No Known Allergies    Medications:   Home Medications:  Current Outpatient Medications on File Prior to Visit   Medication Sig   • allopurinol (ZYLOPRIM) 100 MG tablet TAKE 1 TABLET BY MOUTH EVERY DAY   • aspirin 81 MG tablet Take 81 mg by mouth Daily.   • atorvastatin (LIPITOR) 20 MG tablet Take 20 mg by mouth Daily.   • Cholecalciferol (VITAMIN D-3) 1000 units capsule Take 1,000 Units by mouth.   • ibuprofen (ADVIL,MOTRIN) 600 MG tablet TAKE 1 TABLET BY MOUTH EVERY 6 HOURS FOR 10 DAYS   • meloxicam (MOBIC) 15 MG tablet TAKE 1 TABLET BY MOUTH DAILY WITH FOOD   • thiamine (VITAMIN B-1) 100 MG tablet Take 100 mg by mouth Daily.     No current facility-administered medications on file prior to visit.     Current Medications:  Scheduled Meds:  Continuous Infusions:No current facility-administered medications for this visit.    PRN Meds:.    I have reviewed the patient's medical history in detail and updated the computerized patient record.  Review and summarization of old records include:    Past Medical History:   Diagnosis Date   • Asthma    • Fracture of wrist    • Gout    • Hyperlipemia    • Knee swelling    • Sleep apnea       No past surgical history on file.     Social History     Occupational History   • Not on file   Tobacco Use   • Smoking status: Never   • Smokeless tobacco: Never   Vaping Use   • Vaping Use: Never used   Substance and Sexual Activity   • Alcohol use: Yes      Alcohol/week: 3.0 standard drinks     Types: 2 Cans of beer, 1 Shots of liquor per week   • Drug use: No   • Sexual activity: Yes     Partners: Female      Social History     Social History Narrative   • Not on file        Family History   Problem Relation Age of Onset   • Heart disease Father    • Cancer Father         bladder cancer       ROS: 14 point review of systems was performed and was negative except for documented findings in HPI and today's encounter.     Allergies: No Known Allergies  Constitutional:  Denies fever, shaking or chills   Eyes:  Denies change in visual acuity   HENT:  Denies nasal congestion or sore throat   Respiratory:  Denies cough or shortness of breath   Cardiovascular:  Denies chest pain or severe LE edema   GI:  Denies abdominal pain, nausea, vomiting, bloody stools or diarrhea   Musculoskeletal:  Numbness, tingling, or loss of motor function only as noted above in history of present illness.  : Denies painful urination or hematuria  Integument:  Denies rash, lesion or ulceration   Neurologic:  Denies headache or focal weakness  Endocrine:  Denies lymphadenopathy  Psych:  Denies confusion or change in mental status   Hem:  Denies active bleeding      Physical Exam: 52 y.o. male  Wt Readings from Last 3 Encounters:   04/04/22 117 kg (259 lb)   03/03/22 116 kg (255 lb)   10/05/21 116 kg (255 lb)       There is no height or weight on file to calculate BMI.  No height and weight on file for this encounter.  There were no vitals filed for this visit.  Vital signs reviewed.   General Appearance:    Alert, cooperative, in no acute distress                    Ortho exam  Physical exam of the right knee reveals no effusion, no erythema.  It mild loss of extension and full flexion  Patient has mild varus alignment.  They have mild tenderness to palpation about the medial compartment, no tenderness laterally..  The patient has a negative bounce home, negative Cayetano and a stable ligamentous  exam.  Quad tone is reasonable and symmetric.  There are no overlying skin changes no lymphedema no lymphadenopathy.  There is good hip range of motion which is full symmetric and asymptomatic and a normal ankle exam.             .time    Assessment: Right knee pain he has known degenerative changes we talked about options we could do a steroid injection anytime we can also do the PRP injection which she has had good results with so he will continue his work on quad and core strengthening weight management and he understands his options  Plan:   Follow up as indicated.  Ice, elevate, and rest as needed.  Discussed conservative measures of pain control including ice, bracing.  Also talked about the importance of strengthening and maintaining ideal body weight    Radha Ramirez M.D.

## 2024-05-23 ENCOUNTER — TELEPHONE (OUTPATIENT)
Dept: ORTHOPEDIC SURGERY | Facility: CLINIC | Age: 54
End: 2024-05-23

## 2024-05-23 NOTE — TELEPHONE ENCOUNTER
Caller: Ramana Sullivan    Relationship to patient: Self    Best call back number: [phone]    Chief complaint: R KNEE PAIN    Type of visit: PRP INJ FOR R KNEE    Requested date: ANY DAY IN JULY       Additional notes:PT CALLING TO SET UP PRP INJ FOR R KNEE IN JULY. PLEASE CONTACT PT TO SCHEDULE.

## 2024-06-10 ENCOUNTER — TELEPHONE (OUTPATIENT)
Dept: ORTHOPEDIC SURGERY | Facility: CLINIC | Age: 54
End: 2024-06-10

## 2024-06-10 NOTE — TELEPHONE ENCOUNTER
Patient called and  rescheduled.  Vesta notified   S/P Dr Gordon no longer his PCP/ he have a PCP somewhere else..

## 2024-06-10 NOTE — TELEPHONE ENCOUNTER
Caller: Ramana Sullivan    Relationship to patient: Self    Best call back number     Patient is needing: UNABLE TO WARM TRANSFER.  NEEDS RT KNEE PRP INJECTION RESCHEDULED

## 2024-06-25 NOTE — TELEPHONE ENCOUNTER
The only spots available on the 15th are new patient and same day. Not sure if we could use one of those spots?

## 2024-06-25 NOTE — TELEPHONE ENCOUNTER
Caller: Ramana Sullivan    Relationship to patient: Self    Best call back number:     Chief complaint: RT KNEE     Type of visit: FUP PRP INJECTION     Requested date: 7/12 OR 7/15     If rescheduling, when is the original appointment: 7/8/24     Additional notes: WILL BE OUT OF TOWN WOULD LIKE 12TH OR 15TH

## 2024-07-02 ENCOUNTER — TELEPHONE (OUTPATIENT)
Dept: ORTHOPEDIC SURGERY | Facility: CLINIC | Age: 54
End: 2024-07-02

## 2024-07-02 NOTE — TELEPHONE ENCOUNTER
Caller: Ramana Sullivan    Relationship to patient: Self    Best call back number: [phone]    Type of visit: PRP INJECTION     Requested date: 7-17-24     If rescheduling, when is the original appointment: 7-15-24     Additional notes:N/A

## 2024-07-26 NOTE — PROGRESS NOTES
Patient: Ramana Sullivna  YOB: 1970  Date of Service: 7/26/2024    Chief Complaints: Right knee pain    Subjective:    History of Present Illness: Pt is seen in the office today with complaints of right knee pain I last saw him 2 years ago he was known to have medial compartment OA.  He states has been doing well he was on his feet a lot at work event and his right knee started bothering him.  Is improved a little bit but still having some medial knee pain.  We have tried steroid in the past PRP was really what helped him he is here to do that again today he understands insurance does not cover        Allergies: No Known Allergies    Medications:   Home Medications:  Current Outpatient Medications on File Prior to Visit   Medication Sig    albuterol sulfate  (90 Base) MCG/ACT inhaler     allopurinol (ZYLOPRIM) 100 MG tablet TAKE 1 TABLET BY MOUTH EVERY DAY    aspirin 81 MG tablet Take 81 mg by mouth Daily.    atorvastatin (LIPITOR) 20 MG tablet Take 20 mg by mouth Daily.    Cholecalciferol (VITAMIN D-3) 1000 units capsule Take 1,000 Units by mouth.    ibuprofen (ADVIL,MOTRIN) 600 MG tablet TAKE 1 TABLET BY MOUTH EVERY 6 HOURS FOR 10 DAYS    meloxicam (MOBIC) 15 MG tablet TAKE 1 TABLET BY MOUTH DAILY WITH FOOD    Saxenda 18 MG/3ML injection pen     thiamine (VITAMIN B-1) 100 MG tablet Take 100 mg by mouth Daily.     No current facility-administered medications on file prior to visit.     Current Medications:  Scheduled Meds:  Continuous Infusions:No current facility-administered medications for this visit.    PRN Meds:.    I have reviewed the patient's medical history in detail and updated the computerized patient record.  Review and summarization of old records include:    Past Medical History:   Diagnosis Date    Asthma     Fracture of wrist     Gout     Hyperlipemia     Knee swelling     Sleep apnea       No past surgical history on file.     Social History     Occupational History    Not on  file   Tobacco Use    Smoking status: Never    Smokeless tobacco: Never   Vaping Use    Vaping status: Never Used   Substance and Sexual Activity    Alcohol use: Yes     Alcohol/week: 3.0 standard drinks of alcohol     Types: 2 Cans of beer, 1 Shots of liquor per week    Drug use: No    Sexual activity: Yes     Partners: Female      Social History     Social History Narrative    Not on file        Family History   Problem Relation Age of Onset    Heart disease Father     Cancer Father         bladder cancer       ROS: 14 point review of systems was performed and was negative except for documented findings in HPI and today's encounter.     Allergies: No Known Allergies  Constitutional:  Denies fever, shaking or chills   Eyes:  Denies change in visual acuity   HENT:  Denies nasal congestion or sore throat   Respiratory:  Denies cough or shortness of breath   Cardiovascular:  Denies chest pain or severe LE edema   GI:  Denies abdominal pain, nausea, vomiting, bloody stools or diarrhea   Musculoskeletal:  Numbness, tingling, or loss of motor function only as noted above in history of present illness.  : Denies painful urination or hematuria  Integument:  Denies rash, lesion or ulceration   Neurologic:  Denies headache or focal weakness  Endocrine:  Denies lymphadenopathy  Psych:  Denies confusion or change in mental status   Hem:  Denies active bleeding      Physical Exam: 54 y.o. male  Wt Readings from Last 3 Encounters:   12/02/22 113 kg (250 lb 1.6 oz)   04/04/22 117 kg (259 lb)   03/03/22 116 kg (255 lb)       There is no height or weight on file to calculate BMI.    There were no vitals filed for this visit.  Vital signs reviewed.   General Appearance:    Alert, cooperative, in no acute distress                    Ortho exam  Physical exam of the right knee reveals no effusion, no erythema.  It mild loss of extension and full flexion  Patient has mild varus alignment.  They have mild tenderness to palpation about  the medial compartment, no tenderness laterally..  The patient has a negative bounce home, negative Cayetano and a stable ligamentous exam.  Quad tone is reasonable and symmetric.  There are no overlying skin changes no lymphedema no lymphadenopathy.  There is good hip range of motion which is full symmetric and asymptomatic and a normal ankle exam.       X-rays AP lateral and merchant view of the right knee were taken to evaluate his symptoms and compared x-rays done 2 years ago he has significant medial compartment narrowing with varus alignment mild progression over the last 2 years         Assessment: Right knee OA he wishes to proceed with PRP injection.  Leslie harvested the blood we spun it down I injected it sterilely without difficulty    Plan: As above  Follow up as indicated.  Ice, elevate, and rest as needed.  Discussed conservative measures of pain control including ice, bracing.  Also talked about the importance of strengthening and maintaining ideal body weight    Radha Ramirez M.D.

## 2024-07-29 ENCOUNTER — CLINICAL SUPPORT (OUTPATIENT)
Dept: ORTHOPEDIC SURGERY | Facility: CLINIC | Age: 54
End: 2024-07-29
Payer: COMMERCIAL

## 2024-07-29 VITALS — BODY MASS INDEX: 38.22 KG/M2 | WEIGHT: 273 LBS | TEMPERATURE: 98.6 F | HEIGHT: 71 IN

## 2024-07-29 DIAGNOSIS — M17.11 PRIMARY LOCALIZED OSTEOARTHROSIS OF RIGHT LOWER LEG: ICD-10-CM

## 2024-07-29 DIAGNOSIS — R52 PAIN: Primary | ICD-10-CM

## 2024-07-29 RX ORDER — TAMSULOSIN HYDROCHLORIDE 0.4 MG/1
1 CAPSULE ORAL DAILY
COMMUNITY